# Patient Record
Sex: FEMALE | Race: WHITE | NOT HISPANIC OR LATINO | URBAN - METROPOLITAN AREA
[De-identification: names, ages, dates, MRNs, and addresses within clinical notes are randomized per-mention and may not be internally consistent; named-entity substitution may affect disease eponyms.]

---

## 2024-02-12 PROBLEM — F41.9 ANXIETY DISORDER: Status: ACTIVE | Noted: 2023-05-23

## 2024-02-12 PROBLEM — M48.062 SPINAL STENOSIS OF LUMBAR REGION WITH NEUROGENIC CLAUDICATION: Status: ACTIVE | Noted: 2023-11-27

## 2024-02-12 PROBLEM — I34.1 MITRAL VALVE PROLAPSE: Status: ACTIVE | Noted: 2024-02-12

## 2024-02-12 PROBLEM — C44.01 BASAL CELL CARCINOMA (BCC) OF SKIN OF LIP: Status: ACTIVE | Noted: 2022-12-27

## 2024-02-12 PROBLEM — M81.0 AGE-RELATED OSTEOPOROSIS WITHOUT CURRENT PATHOLOGICAL FRACTURE: Status: ACTIVE | Noted: 2020-10-30

## 2024-02-12 PROBLEM — M54.30 SCIATICA: Status: ACTIVE | Noted: 2020-10-30

## 2024-02-12 PROBLEM — I47.10 SVT (SUPRAVENTRICULAR TACHYCARDIA) (H): Status: ACTIVE | Noted: 2022-11-14

## 2024-02-12 PROBLEM — I34.0 MODERATE MITRAL VALVE REGURGITATION: Status: ACTIVE | Noted: 2022-11-14

## 2024-02-12 RX ORDER — ESCITALOPRAM OXALATE 10 MG/1
10 TABLET ORAL DAILY
COMMUNITY
Start: 2023-12-28 | End: 2024-12-27

## 2024-02-12 RX ORDER — LISINOPRIL 10 MG/1
10 TABLET ORAL DAILY
COMMUNITY
Start: 2023-12-28

## 2024-02-12 RX ORDER — GABAPENTIN 300 MG/1
300 CAPSULE ORAL EVERY 8 HOURS
COMMUNITY
Start: 2023-11-02

## 2024-02-12 RX ORDER — CETIRIZINE HYDROCHLORIDE 10 MG/1
10 TABLET ORAL DAILY PRN
COMMUNITY

## 2024-02-12 RX ORDER — ALENDRONATE SODIUM 70 MG/1
70 TABLET ORAL
COMMUNITY
Start: 2023-12-28 | End: 2024-12-27

## 2024-02-12 RX ORDER — CHLORAL HYDRATE 500 MG
1000 CAPSULE ORAL
COMMUNITY
Start: 2023-11-29

## 2024-03-07 ENCOUNTER — OFFICE VISIT (OUTPATIENT)
Dept: CARDIOLOGY | Facility: CLINIC | Age: 79
End: 2024-03-07
Payer: COMMERCIAL

## 2024-03-07 VITALS
SYSTOLIC BLOOD PRESSURE: 138 MMHG | RESPIRATION RATE: 18 BRPM | WEIGHT: 164.4 LBS | HEART RATE: 75 BPM | OXYGEN SATURATION: 97 % | DIASTOLIC BLOOD PRESSURE: 94 MMHG

## 2024-03-07 DIAGNOSIS — I34.1 MITRAL VALVE PROLAPSE: Primary | ICD-10-CM

## 2024-03-07 PROCEDURE — 99204 OFFICE O/P NEW MOD 45 MIN: CPT | Performed by: INTERNAL MEDICINE

## 2024-03-07 NOTE — LETTER
3/7/2024    Jocelin Crenshaw MD  [facility] [address]  [facility] [address]    RE: Deanna Preciado       Dear Colleague,     I had the pleasure of seeing Deanna Preciado in the [facility].    Cardiology Consultation       Assessment & Plan    1.  Mitral valve prolapse on outside echocardiogram with moderate to severe eccentric posteriorly directed mitral regurgitation  2.  Hypertension  3.  History of palpitations with workup unrevealing, notable for PACs  4.  Osteoporosis  5.  Eczema  6.  History of anxiety  7.  Hyperlipidemia    Recommendations    1.  At least moderate mitral valve regurgitation with mitral valve prolapse: We will get an updated echocardiogram within the [place] system within the next month.  Clinically she is doing well.  I have advised her that should this show significant findings we may need a CHITO for better assessment.  May also get the mitral valve team if indeed it is severe to consider MitraClip.    2.  Echocardiogram will be ordered and patient will return to clinic to review results with us in 4 to 6 weeks.    Thank you kindly for consult      Kerwin Parada MD, MD          History of present illness    Patient is a very pleasant 78-year-old woman who presents to establish local cardiac care.  I am a cardiology caregiver for her .  Her  was seen by our team and underwent TAVR successfully and is doing well.  She herself has had a cardiac murmur all her life and in 1999 was diagnosed with mitral valve prolapse.  Since that time she has been followed in the [facility] periodically with echocardiograms.  Her most recent echocardiogram from them was in 2021 at which point she had normal LV systolic function and moderate anterior leaflet prolapse with moderate regurgitation.  In [facility] a [facility]  This occurred in November 2023 and the findings are as listed  below.  It appears the report had suggested moderate to severe eccentric mitral valve regurgitation directed posteriorly with bileaflet prolapse with anterior greater than posterior.  LV systolic function remained stable.  She underwent her surgery without limitations or difficulty.  Clinically she reports of no significant change in her functional status.  He is able to do her activities of daily living without limitations.  She recently returned back from a trip to [place] and she kept up with her peers and did not feel any shortness of breath chest pain PND or any other cardiac related concerns.  Here for a placement of care.          Echocardiogram November 2023 outside hospital   1. Normal sinus rhythm during study.     2. Normal LV size with normal wall thickness. Calculated biplane LVEF   61%.   No regional wall motion abnormalities. (Normal function). Indeterminate   diastolic function.     3. Normal RV size and systolic function.     4. Moderate-severe eccentric mitral valve regurgitation directed   posteriorly.    5. Mechanism of MR is leaflet prolapse (bileaflet; anterior > posterior).     6. Eccentric, posteriorly directed mitral regurgitation, may   underestimate   degree of insufficiency.     7. A prior study is not available for comparison.         Primary Care Physician  No primary care provider on file.    Patient Active Problem List   Diagnosis    Anxiety disorder    Basal cell carcinoma (BCC) of skin of lip    Essential hypertension    Mitral valve prolapse    Moderate mitral valve regurgitation    Sciatica    Spinal stenosis of lumbar region with neurogenic claudication    SVT (supraventricular tachycardia)    Age-related osteoporosis without current pathological fracture       Past Medical History  I have reviewed this patient's medical history and updated it with pertinent information if needed.   No past medical history on file.    Past Surgical History  I have reviewed this patient's surgical  history and updated it with pertinent information if needed.  No past surgical history on file.    Prior to Admission Medications  Cannot display prior to admission medications because the patient has not been admitted in this contact.     [unfilled]  [unfilled]  Allergies  Allergies   Allergen Reactions    Sulfa Antibiotics Rash       Social History       Family History  No family history on file.    Review of Systems  The comprehensive 10 point Review of Systems is negative other than noted in the HPI or here.     Physical Exam  Vital Signs with Ranges  BP: ()/()   Arterial Line BP: ()/()   Wt Readings from Last 4 Encounters:   No data found for Wt     [unfilled]      Vitals: There were no vitals taken for this visit.    GENERAL: alert and no distress  EYES: Eyes grossly normal to inspection.  No discharge or erythema, or obvious scleral/conjunctival abnormalities.  RESP: No audible wheeze, cough, or visible cyanosis.    SKIN: Visible skin clear. No significant rash, abnormal pigmentation or lesions.  NEURO: Cranial nerves grossly intact.  Mentation and speech appropriate for age.  PSYCH: Appropriate affect, tone, and pace of words       Thank you for allowing me to participate in the care of your patient.      Sincerely,     Kerwin Parada MD     [facility]  cc:   No referring provider defined for this encounter.

## 2024-03-07 NOTE — PROGRESS NOTES
Cardiology Consultation       Assessment & Plan     1.  Mitral valve prolapse on outside echocardiogram with moderate to severe eccentric posteriorly directed mitral regurgitation  2.  Hypertension  3.  History of palpitations with workup unrevealing, notable for PACs  4.  Osteoporosis  5.  Eczema  6.  History of anxiety  7.  Hyperlipidemia    Recommendations    1.  At least moderate mitral valve regurgitation with mitral valve prolapse: We will get an updated echocardiogram within the [place] system within the next month.  Clinically she is doing well.  I have advised her that should this show significant findings we may need a CHITO for better assessment.  May also get the mitral valve team if indeed it is severe to consider MitraClip.    2.  Echocardiogram will be ordered and patient will return to clinic to review results with us in 4 to 6 weeks.    Thank you kindly for consult      Kerwin Parada MD, MD          History of present illness    Patient is a very pleasant 78-year-old woman who presents to establish local cardiac care.  I am a cardiology caregiver for her .  Her  was seen by our team and underwent TAVR successfully and is doing well.  She herself has had a cardiac murmur all her life and in 1999 was diagnosed with mitral valve prolapse.  Since that time she has been followed in the [facility] periodically with echocardiograms.  Her most recent echocardiogram from them was in 2021 at which point she had normal LV systolic function and moderate anterior leaflet prolapse with moderate regurgitation.  In preparation for a surgery she underwent an echocardiogram at [facility].  This occurred in November 2023 and the findings are as listed below.  It appears the report had suggested moderate to severe eccentric mitral valve regurgitation directed posteriorly with bileaflet prolapse with anterior greater than posterior.  LV systolic function remained stable.  She underwent her  surgery without limitations or difficulty.  Clinically she reports of no significant change in her functional status.  He is able to do her activities of daily living without limitations.  She recently returned back from a trip to [place] and she kept up with her peers and did not feel any shortness of breath chest pain PND or any other cardiac related concerns.  Here for a placement of care.          Echocardiogram November 2023 outside hospital   1. Normal sinus rhythm during study.     2. Normal LV size with normal wall thickness. Calculated biplane LVEF   61%.   No regional wall motion abnormalities. (Normal function). Indeterminate   diastolic function.     3. Normal RV size and systolic function.     4. Moderate-severe eccentric mitral valve regurgitation directed   posteriorly.    5. Mechanism of MR is leaflet prolapse (bileaflet; anterior > posterior).     6. Eccentric, posteriorly directed mitral regurgitation, may   underestimate   degree of insufficiency.     7. A prior study is not available for comparison.         Primary Care Physician   No primary care provider on file.    Patient Active Problem List   Diagnosis    Anxiety disorder    Basal cell carcinoma (BCC) of skin of lip    Essential hypertension    Mitral valve prolapse    Moderate mitral valve regurgitation    Sciatica    Spinal stenosis of lumbar region with neurogenic claudication    SVT (supraventricular tachycardia)    Age-related osteoporosis without current pathological fracture       Past Medical History   I have reviewed this patient's medical history and updated it with pertinent information if needed.   No past medical history on file.    Past Surgical History   I have reviewed this patient's surgical history and updated it with pertinent information if needed.  No past surgical history on file.    Prior to Admission Medications   Cannot display prior to admission medications because the patient has not been admitted in this contact.      [unfilled]  [unfilled]  Allergies   Allergies   Allergen Reactions    Sulfa Antibiotics Rash       Social History        Family History   No family history on file.    Review of Systems   The comprehensive 10 point Review of Systems is negative other than noted in the HPI or here.     Physical Exam   Vital Signs with Ranges  BP: ()/()   Arterial Line BP: ()/()   Wt Readings from Last 4 Encounters:   No data found for Wt     [unfilled]      Vitals: There were no vitals taken for this visit.    GENERAL: alert and no distress  EYES: Eyes grossly normal to inspection.  No discharge or erythema, or obvious scleral/conjunctival abnormalities.  RESP: No audible wheeze, cough, or visible cyanosis.    SKIN: Visible skin clear. No significant rash, abnormal pigmentation or lesions.  NEURO: Cranial nerves grossly intact.  Mentation and speech appropriate for age.  PSYCH: Appropriate affect, tone, and pace of words

## 2024-04-01 ENCOUNTER — HOSPITAL ENCOUNTER (OUTPATIENT)
Dept: CARDIOLOGY | Facility: CLINIC | Age: 79
Discharge: HOME | End: 2024-04-01
Attending: INTERNAL MEDICINE | Admitting: INTERNAL MEDICINE
Payer: COMMERCIAL

## 2024-04-01 DIAGNOSIS — I34.1 MITRAL VALVE PROLAPSE: ICD-10-CM

## 2024-04-01 LAB — LVEF ECHO: NORMAL

## 2024-04-01 PROCEDURE — 93306 TTE W/DOPPLER COMPLETE: CPT | Mod: 26 | Performed by: INTERNAL MEDICINE

## 2024-04-01 PROCEDURE — 93306 TTE W/DOPPLER COMPLETE: CPT

## 2024-04-19 ENCOUNTER — HOSPITAL ENCOUNTER (OUTPATIENT)
Facility: CLINIC | Age: 79
End: 2024-04-19
Admitting: INTERNAL MEDICINE
Payer: COMMERCIAL

## 2024-04-19 ENCOUNTER — OFFICE VISIT (OUTPATIENT)
Dept: CARDIOLOGY | Facility: CLINIC | Age: 79
End: 2024-04-19
Attending: INTERNAL MEDICINE
Payer: COMMERCIAL

## 2024-04-19 VITALS
DIASTOLIC BLOOD PRESSURE: 81 MMHG | HEART RATE: 84 BPM | SYSTOLIC BLOOD PRESSURE: 122 MMHG | WEIGHT: 164 LBS | RESPIRATION RATE: 16 BRPM | OXYGEN SATURATION: 100 %

## 2024-04-19 DIAGNOSIS — I34.1 MITRAL VALVE PROLAPSE: Primary | ICD-10-CM

## 2024-04-19 PROCEDURE — 99214 OFFICE O/P EST MOD 30 MIN: CPT | Performed by: INTERNAL MEDICINE

## 2024-04-19 NOTE — PROGRESS NOTES
Cardiology Consultation       Assessment & Plan     1.  Mitral valve prolapse on outside echocardiogram with moderate to severe eccentric posteriorly directed mitral regurgitation  2.  Hypertension  3.  History of palpitations with workup unrevealing, notable for PACs  4.  Osteoporosis  5.  Eczema  6.  History of anxiety  7.  Hyperlipidemia    Recommendations    1.  We have reviewed the findings of the echocardiogram in detail with patient.  It confirms the diagnosis of eccentric moderate to severe posteriorly directed mitral vegetation with anterior greater than posterior prolapse.    2.  Arrange for outpatient CHITO and [facility].  Patient has full neck mobility, no loose teeth, and no difficulty swallowing liquids or solids.    3.  We will send patient to mitral valve clinic for consideration of MitraClip or surgical repair.    4.  We will tentatively schedule for her to return to clinic in 6 months time with me.  We can certainly adjust those plans based on her evaluation in the mitral valve clinic    Thank you kindly for consult      Kerwin Parada MD, MD          History of present illness    Patient is a very pleasant 78-year-old woman who presents to establish local cardiac care.  I am a cardiology caregiver for her .  Her  was seen by our team and underwent TAVR successfully and is doing well.  She herself has had a cardiac murmur all her life and in 1999 was diagnosed with mitral valve prolapse.  Since that time she has been followed in the [facility] periodically with echocardiograms.  Her most recent echocardiogram from them was in 2021 at which point she had normal LV systolic function and moderate anterior leaflet prolapse with moderate regurgitation.  In preparation for a surgery she underwent an echocardiogram at [facility].  This occurred in November 2023 and the findings are as listed below.  It appears the report had suggested moderate to severe  eccentric mitral valve regurgitation directed posteriorly with bileaflet prolapse with anterior greater than posterior.  LV systolic function remained stable.  She underwent her surgery without limitations or difficulty.  Clinically she reports of no significant change in her functional status.  He is able to do her activities of daily living without limitations.  She recently returned back from a trip to [place] and she kept up with her peers and did not feel any shortness of breath chest pain PND or any other cardiac related concerns.  Here for establishment of care.      Echo 2024  Left ventricular systolic function is normal.  The visual ejection fraction is 60-65%.  No regional wall motion abnormalities noted.  Moderate mitral valve prolapse, anterior leaflet  Possible partially flail scallop.  There is moderately severe (3+) mitral regurgitation.  Flow reversal noted in pulmonary veins consistent with significant mitral  regurgitation.  The study was technically adequate. There is no comparison study available      Echocardiogram November 2023 outside hospital   1. Normal sinus rhythm during study.     2. Normal LV size with normal wall thickness. Calculated biplane LVEF   61%.   No regional wall motion abnormalities. (Normal function). Indeterminate   diastolic function.     3. Normal RV size and systolic function.     4. Moderate-severe eccentric mitral valve regurgitation directed   posteriorly.    5. Mechanism of MR is leaflet prolapse (bileaflet; anterior > posterior).     6. Eccentric, posteriorly directed mitral regurgitation, may   underestimate   degree of insufficiency.     7. A prior study is not available for comparison.         Primary Care Physician   Jocelin Crenshaw    Patient Active Problem List   Diagnosis    Anxiety disorder    Basal cell carcinoma (BCC) of skin of lip    Essential hypertension    Mitral valve prolapse    Moderate mitral valve regurgitation    Sciatica    Spinal stenosis of  lumbar region with neurogenic claudication    SVT (supraventricular tachycardia)    Age-related osteoporosis without current pathological fracture       Past Medical History   I have reviewed this patient's medical history and updated it with pertinent information if needed.   No past medical history on file.    Past Surgical History   I have reviewed this patient's surgical history and updated it with pertinent information if needed.  No past surgical history on file.    Prior to Admission Medications   Cannot display prior to admission medications because the patient has not been admitted in this contact.     [unfilled]  [unfilled]  Allergies   Allergies   Allergen Reactions    Banana GI Disturbance    Sulfa Antibiotics Rash       Social History    reports that she quit smoking about 50 years ago. Her smoking use included cigarettes. She has never been exposed to tobacco smoke. She has never used smokeless tobacco.    Family History   No family history on file.    Review of Systems   The comprehensive 10 point Review of Systems is negative other than noted in the HPI or here.     Physical Exam   Vital Signs with Ranges     Wt Readings from Last 4 Encounters:   03/07/24 74.6 kg (164 lb 6.4 oz)     [unfilled]      Vitals: LMP  (LMP Unknown)     GENERAL: alert and no distress  EYES: Eyes grossly normal to inspection.  No discharge or erythema, or obvious scleral/conjunctival abnormalities.  RESP: No audible wheeze, cough, or visible cyanosis.    SKIN: Visible skin clear. No significant rash, abnormal pigmentation or lesions.  NEURO: Cranial nerves grossly intact.  Mentation and speech appropriate for age.  PSYCH: Appropriate affect, tone, and pace of words

## 2024-04-19 NOTE — PATIENT INSTRUCTIONS
"Transesophageal Echocardiogram (CHITO)  [place], [place]      Please call clinic with any new COVID like symptoms prior to procedure.    2.   Transesophageal Echocardiogram (CHITO) to be done at [facility] on ____________ . Please arrive at __________ . If you need to contact [facility] for any reason, please call [phone] option #2.    3.    Follow up with the Structural Heart Clinic at [facility] in [place]. They will call you to schedule an appointment.     Call the [facility] Nurse line with any questions [phone] Daniela RN, Marie RN; Miroslava RN    In preparation for your procedure, we require that you do the following:    NO foods or liquids 8 hours prior to arrival.    Take your usual morning medications with a small sip of water immediately upon arising on the morning of your procedure unless outlined below.    You will be unable to drive after your procedure; please arrange to have someone drive you home. Make sure that there is a responsible adult with you for 24 hours after your procedure. Your procedure will be cancelled if you do not have transportation home or someone staying with you for 24 hrs.    Your procedure will be done at [facility] located at [address] [place] [id number]. Please park in the  Skyway Ramp  on the west side of [facility] on [address]. Take the skyway over [facility] to the hospital. Please check in on the first floor, \"Skyway Welcome Desk\" which is one floor down from the skyway level.    If you have any questions about your upcoming procedure, please contact nursing at [facility] clinic at [phone] or at [facility] at [phone].   "

## 2024-04-19 NOTE — LETTER
4/19/2024    Jocelin Crenshaw MD  [facility] [address]  [facility] [address]    RE: Deanna Osei Preciado       Dear Colleague,     I had the pleasure of seeing Daenna Preciado in the [facility].    Cardiology Consultation       Assessment & Plan    1.  Mitral valve prolapse on outside echocardiogram with moderate to severe eccentric posteriorly directed mitral regurgitation  2.  Hypertension  3.  History of palpitations with workup unrevealing, notable for PACs  4.  Osteoporosis  5.  Eczema  6.  History of anxiety  7.  Hyperlipidemia    Recommendations    1.  We have reviewed the findings of the echocardiogram in detail with patient.  It confirms the diagnosis of eccentric moderate to severe posteriorly directed mitral vegetation with anterior greater than posterior prolapse.    2.  Arrange for outpatient CHITO and [facility].  Patient has full neck mobility, no loose teeth, and no difficulty swallowing liquids or solids.    3.  We will send patient to mitral valve clinic for consideration of MitraClip or surgical repair.    4.  We will tentatively schedule for her to return to clinic in 6 months time with me.  We can certainly adjust those plans based on her evaluation in the mitral valve clinic    Thank you kindly for consult      Kerwin Parada MD, MD          History of present illness    Patient is a very pleasant 78-year-old woman who presents to establish local cardiac care.  I am a cardiology caregiver for her .  Her  was seen by our team and underwent TAVR successfully and is doing well.  She herself has had a cardiac murmur all her life and in 1999 was diagnosed with mitral valve prolapse.  Since that time she has been followed in the [facility] periodically with echocardiograms.  Her most recent echocardiogram from them was in 2021 at which point she had normal LV systolic function and moderate anterior leaflet prolapse  with moderate regurgitation.  In preparation for a surgery she underwent an echocardiogram at [facility].  This occurred in November 2023 and the findings are as listed below.  It appears the report had suggested moderate to severe eccentric mitral valve regurgitation directed posteriorly with bileaflet prolapse with anterior greater than posterior.  LV systolic function remained stable.  She underwent her surgery without limitations or difficulty.  Clinically she reports of no significant change in her functional status.  He is able to do her activities of daily living without limitations.  She recently returned back from a trip to [place] and she kept up with her peers and did not feel any shortness of breath chest pain PND or any other cardiac related concerns.  Here for establishment of care.      Echo 2024  Left ventricular systolic function is normal.  The visual ejection fraction is 60-65%.  No regional wall motion abnormalities noted.  Moderate mitral valve prolapse, anterior leaflet  Possible partially flail scallop.  There is moderately severe (3+) mitral regurgitation.  Flow reversal noted in pulmonary veins consistent with significant mitral  regurgitation.  The study was technically adequate. There is no comparison study available      Echocardiogram November 2023 outside hospital   1. Normal sinus rhythm during study.     2. Normal LV size with normal wall thickness. Calculated biplane LVEF   61%.   No regional wall motion abnormalities. (Normal function). Indeterminate   diastolic function.     3. Normal RV size and systolic function.     4. Moderate-severe eccentric mitral valve regurgitation directed   posteriorly.    5. Mechanism of MR is leaflet prolapse (bileaflet; anterior > posterior).     6. Eccentric, posteriorly directed mitral regurgitation, may   underestimate   degree of insufficiency.     7. A prior study is not available for comparison.         Primary Care Physician  Jocelin  Den    Patient Active Problem List   Diagnosis    Anxiety disorder    Basal cell carcinoma (BCC) of skin of lip    Essential hypertension    Mitral valve prolapse    Moderate mitral valve regurgitation    Sciatica    Spinal stenosis of lumbar region with neurogenic claudication    SVT (supraventricular tachycardia)    Age-related osteoporosis without current pathological fracture       Past Medical History  I have reviewed this patient's medical history and updated it with pertinent information if needed.   No past medical history on file.    Past Surgical History  I have reviewed this patient's surgical history and updated it with pertinent information if needed.  No past surgical history on file.    Prior to Admission Medications  Cannot display prior to admission medications because the patient has not been admitted in this contact.     [unfilled]  [unfilled]  Allergies  Allergies   Allergen Reactions    Banana GI Disturbance    Sulfa Antibiotics Rash       Social History   reports that she quit smoking about 50 years ago. Her smoking use included cigarettes. She has never been exposed to tobacco smoke. She has never used smokeless tobacco.    Family History  No family history on file.    Review of Systems  The comprehensive 10 point Review of Systems is negative other than noted in the HPI or here.     Physical Exam  Vital Signs with Ranges     Wt Readings from Last 4 Encounters:   03/07/24 74.6 kg (164 lb 6.4 oz)     [unfilled]      Vitals: LMP  (LMP Unknown)     GENERAL: alert and no distress  EYES: Eyes grossly normal to inspection.  No discharge or erythema, or obvious scleral/conjunctival abnormalities.  RESP: No audible wheeze, cough, or visible cyanosis.    SKIN: Visible skin clear. No significant rash, abnormal pigmentation or lesions.  NEURO: Cranial nerves grossly intact.  Mentation and speech appropriate for age.  PSYCH: Appropriate affect, tone, and pace of words       Thank you for  allowing me to participate in the care of your patient.      Sincerely,     Kerwin Parada MD     [facility]  cc:   Kerwin Parada MD  [address]  [place] [id number]

## 2024-04-26 ENCOUNTER — TELEPHONE (OUTPATIENT)
Dept: CARDIOLOGY | Facility: CLINIC | Age: 79
End: 2024-04-26
Payer: COMMERCIAL

## 2024-04-26 NOTE — TELEPHONE ENCOUNTER
[facility] Call Center    Phone Message    May a detailed message be left on voicemail: yes     Reason for Call: Other: pt has been calling to try and schedule order from Tal Torres to see pt for Mitral valve prolapse [I34.1], structural/valve mitral clinic. Pt has had several in basket messages from priority line to ADDISON team already trying to get someone to reach back out to the patient to schedule or clarify. SAC is not sure if this is a return provider change, return cardiology, new/return structural, or what the Mitral clinic is. Please clarify orders and how pt needs to be scheduled and reach out to pt to schedule.      Action Taken: Other: cardiology     Travel Screening: Not Applicable                                                              Thank you!  [facility]

## 2024-04-26 NOTE — TELEPHONE ENCOUNTER
Structural Heart Clinic [facility]    RUPERTO referral received from: Dr. Parada    Chart and recent lab results reviewed.  CHITO: 5/3/24  Contrast allergy: None noted     Contacted patient to introduce self.     Last dental visit: Goes routinely. Last visit was a couple weeks ago.   Support/living situation: Independent with  Panchito Reis.     Arranged RUPERTO consult with Dr. Torres  Provided direct contact information.     Gretel Lozano RN  Structural Heart Coordinator  [facility]

## 2024-05-01 ENCOUNTER — TELEPHONE (OUTPATIENT)
Dept: CARDIOLOGY | Facility: CLINIC | Age: 79
End: 2024-05-01
Payer: COMMERCIAL

## 2024-05-01 NOTE — TELEPHONE ENCOUNTER
Called patient to touch base before scheduled CHITO on Friday 05/03/2024 and reviewed with patient and Dr. Torres is recommending we move her CHITO so it is completed by one of our structural imagining providers.    Patient stated she understood. We discussed timing for work-up and plan was made to keep her office visit with Dr. Torres on 05/09/2024 to discuss work-up. Patient stated she is feeling really well and is currently still able to do all the things she wants. Patient stated she walked 1 mile this AM without needing to take any breaks. Reviewed with patient that Dr. Torres will discuss timing for mitral valve intervention given her symptoms as well. Patient stated she understood.    Provided patient with my direct callback phone number and encouraged her to call with any questions or concerns.

## 2024-05-09 ENCOUNTER — OFFICE VISIT (OUTPATIENT)
Dept: CARDIOLOGY | Facility: CLINIC | Age: 79
End: 2024-05-09
Attending: INTERNAL MEDICINE
Payer: COMMERCIAL

## 2024-05-09 VITALS
RESPIRATION RATE: 15 BRPM | HEIGHT: 67 IN | WEIGHT: 162 LBS | SYSTOLIC BLOOD PRESSURE: 158 MMHG | HEART RATE: 75 BPM | OXYGEN SATURATION: 100 % | DIASTOLIC BLOOD PRESSURE: 80 MMHG | BODY MASS INDEX: 25.43 KG/M2

## 2024-05-09 DIAGNOSIS — I34.1 MITRAL VALVE PROLAPSE: ICD-10-CM

## 2024-05-09 PROCEDURE — 99215 OFFICE O/P EST HI 40 MIN: CPT | Performed by: INTERNAL MEDICINE

## 2024-05-09 PROCEDURE — 93000 ELECTROCARDIOGRAM COMPLETE: CPT | Performed by: INTERNAL MEDICINE

## 2024-05-09 NOTE — PROGRESS NOTES
HPI and Plan:     Deanna Preciado is a 79 y/o female referred for possible TMVR>  Patient has no limitations.  Walks 1-2X per week walking 1 mile in 25 minutes. Recently had spine surgery in Nov limiting her so maybe has slowed compared to that slightly.      Had been following with Dr. Amador in valve clinic at [place].  Saw last 2021.  MR at that time was moderate.     Tamara's mother daughter was also on the phone who is a psychiatrist.  She admits that Deanna has not slowed down or has any shortness of breath with walking.  I reviewed the echocardiogram personally.  The left atrial size is small as is the right atrial size.  LV size and RV size and function are normal.  There is no evidence of pulmonary hypertension.  Given all of the above including lack of symptoms think it is best to continue watching Deanna.  I will schedule her back to see me in 6 months time and if asymptomatic still then repeat an echo at 1 years time to continue to follow this eccentric probable 3+ mitral regurgitation.            Today's clinic visit entailed:  Review of the result(s) of each unique test - echocardiogram  Assessment requiring an independent historian(s) - family - daughter  Ordering of each unique test  45 minutes spent by me on the date of the encounter doing chart review, history and exam, documentation and further activities per the note  Provider  Link to [facility] Help Grid     The level of medical decision making during this visit was of high complexity.      Orders Placed This Encounter   Procedures    EKG 12-lead complete w/read - Clinics (performed today)     No orders of the defined types were placed in this encounter.    There are no discontinued medications.      Encounter Diagnosis   Name Primary?    Mitral valve prolapse        CURRENT MEDICATIONS:  Current Outpatient Medications   Medication Sig Dispense Refill    alendronate (FOSAMAX) 70 MG tablet Take 70 mg by mouth every 7 days      Calcium Carb-Cholecalciferol  "(CALCIUM 500 + D PO)       cetirizine (ZYRTEC) 10 MG tablet Take 10 mg by mouth daily as needed for allergies      escitalopram (LEXAPRO) 10 MG tablet Take 10 mg by mouth daily      fish oil-omega-3 fatty acids 1000 MG capsule Take 1,000 mg by mouth      lisinopril (ZESTRIL) 10 MG tablet Take 10 mg by mouth daily      Multiple Vitamins-Minerals (MACULAR HEALTH FORMULA PO)       Multiple Vitamins-Minerals (WOMENS 50+ MULTI VITAMIN PO)       gabapentin (NEURONTIN) 300 MG capsule Take 300 mg by mouth every 8 hours (Patient not taking: Reported on 3/7/2024)         ALLERGIES     Allergies   Allergen Reactions    Banana GI Disturbance    Sulfa Antibiotics Rash       PAST MEDICAL HISTORY:  No past medical history on file.    PAST SURGICAL HISTORY:  No past surgical history on file.    FAMILY HISTORY:  No family history on file.    SOCIAL HISTORY:  Social History     Socioeconomic History    Marital status:    Tobacco Use    Smoking status: Former     Current packs/day: 0.00     Types: Cigarettes     Quit date:      Years since quittin.3     Passive exposure: Never    Smokeless tobacco: Never       Review of Systems:  Skin:        Eyes:       ENT:       Respiratory:       Cardiovascular:       Gastroenterology:      Genitourinary:       Musculoskeletal:       Neurologic:       Psychiatric:       Heme/Lymph/Imm:       Endocrine:         Physical Exam:  Vitals: BP (!) 158/80   Pulse 75   Resp 15   Ht 1.702 m (5' 7\")   Wt 73.5 kg (162 lb)   LMP  (LMP Unknown)   SpO2 100%   BMI 25.37 kg/m      Constitutional:           Skin:             Head:           Eyes:           Lymph:      ENT:           Neck:           Respiratory:            Cardiac:                                                           GI:           Extremities and Muscular Skeletal:                 Neurological:           Psych:         Recent Lab Results:  LIPID RESULTS:  No results found for: \"CHOL\", \"HDL\", \"LDL\", \"TRIG\", " "\"CHOLHDLRATIO\"    LIVER ENZYME RESULTS:  No results found for: \"AST\", \"ALT\"    CBC RESULTS:  No results found for: \"WBC\", \"RBC\", \"HGB\", \"HCT\", \"MCV\", \"MCH\", \"MCHC\", \"RDW\", \"PLT\"    BMP RESULTS:  No results found for: \"NA\", \"POTASSIUM\", \"CHLORIDE\", \"CO2\", \"ANIONGAP\", \"GLC\", \"BUN\", \"CR\", \"GFRESTIMATED\", \"GFRESTBLACK\", \"SAEED\"     A1C RESULTS:  No results found for: \"A1C\"    INR RESULTS:  No results found for: \"INR\"        CC  Kerwin Parada MD  [address]  [place] [id number]                "

## 2024-05-09 NOTE — LETTER
5/9/2024    Jocelin Crenshaw MD  [facility] [address]  [facility] [address]    RE: Deanna Preciado       Dear Colleague,     I had the pleasure of seeing Deanna Preciado in the [facility].  HPI and Plan:     Deanna Preicado is a 77 y/o female referred for possible TMVR>  Patient has no limitations.  Walks 1-2X per week walking 1 mile in 25 minutes. Recently had spine surgery in Nov limiting her so maybe has slowed compared to that slightly.      Had been following with Dr. Amador in valve clinic at [place].  Saw last 2021.  MR at that time was moderate.     Tamara's mother daughter was also on the phone who is a psychiatrist.  She admits that Deanna has not slowed down or has any shortness of breath with walking.  I reviewed the echocardiogram personally.  The left atrial size is small as is the right atrial size.  LV size and RV size and function are normal.  There is no evidence of pulmonary hypertension.  Given all of the above including lack of symptoms think it is best to continue watching Deanna.  I will schedule her back to see me in 6 months time and if asymptomatic still then repeat an echo at 1 years time to continue to follow this eccentric probable 3+ mitral regurgitation.            Today's clinic visit entailed:  Review of the result(s) of each unique test - echocardiogram  Assessment requiring an independent historian(s) - family - daughter  Ordering of each unique test  45 minutes spent by me on the date of the encounter doing chart review, history and exam, documentation and further activities per the note  Provider  Link to [facility] Help Grid     The level of medical decision making during this visit was of high complexity.      Orders Placed This Encounter   Procedures    EKG 12-lead complete w/read - Clinics (performed today)     No orders of the defined types were placed in this encounter.    There are no discontinued medications.      Encounter  "Diagnosis   Name Primary?    Mitral valve prolapse        CURRENT MEDICATIONS:  Current Outpatient Medications   Medication Sig Dispense Refill    alendronate (FOSAMAX) 70 MG tablet Take 70 mg by mouth every 7 days      Calcium Carb-Cholecalciferol (CALCIUM 500 + D PO)       cetirizine (ZYRTEC) 10 MG tablet Take 10 mg by mouth daily as needed for allergies      escitalopram (LEXAPRO) 10 MG tablet Take 10 mg by mouth daily      fish oil-omega-3 fatty acids 1000 MG capsule Take 1,000 mg by mouth      lisinopril (ZESTRIL) 10 MG tablet Take 10 mg by mouth daily      Multiple Vitamins-Minerals (MACULAR HEALTH FORMULA PO)       Multiple Vitamins-Minerals (WOMENS 50+ MULTI VITAMIN PO)       gabapentin (NEURONTIN) 300 MG capsule Take 300 mg by mouth every 8 hours (Patient not taking: Reported on 3/7/2024)         ALLERGIES     Allergies   Allergen Reactions    Banana GI Disturbance    Sulfa Antibiotics Rash       PAST MEDICAL HISTORY:  No past medical history on file.    PAST SURGICAL HISTORY:  No past surgical history on file.    FAMILY HISTORY:  No family history on file.    SOCIAL HISTORY:  Social History     Socioeconomic History    Marital status:    Tobacco Use    Smoking status: Former     Current packs/day: 0.00     Types: Cigarettes     Quit date:      Years since quittin.3     Passive exposure: Never    Smokeless tobacco: Never       Review of Systems:  Skin:        Eyes:       ENT:       Respiratory:       Cardiovascular:       Gastroenterology:      Genitourinary:       Musculoskeletal:       Neurologic:       Psychiatric:       Heme/Lymph/Imm:       Endocrine:         Physical Exam:  Vitals: BP (!) 158/80   Pulse 75   Resp 15   Ht 1.702 m (5' 7\")   Wt 73.5 kg (162 lb)   LMP  (LMP Unknown)   SpO2 100%   BMI 25.37 kg/m      Constitutional:           Skin:             Head:           Eyes:           Lymph:      ENT:           Neck:           Respiratory:            Cardiac:                   " "                                        GI:           Extremities and Muscular Skeletal:                 Neurological:           Psych:         Recent Lab Results:  LIPID RESULTS:  No results found for: \"CHOL\", \"HDL\", \"LDL\", \"TRIG\", \"CHOLHDLRATIO\"    LIVER ENZYME RESULTS:  No results found for: \"AST\", \"ALT\"    CBC RESULTS:  No results found for: \"WBC\", \"RBC\", \"HGB\", \"HCT\", \"MCV\", \"MCH\", \"MCHC\", \"RDW\", \"PLT\"    BMP RESULTS:  No results found for: \"NA\", \"POTASSIUM\", \"CHLORIDE\", \"CO2\", \"ANIONGAP\", \"GLC\", \"BUN\", \"CR\", \"GFRESTIMATED\", \"GFRESTBLACK\", \"SAEED\"     A1C RESULTS:  No results found for: \"A1C\"    INR RESULTS:  No results found for: \"INR\"      CC  Kerwin Parada MD  [address] [facility] [address]  [place] [address]      Thank you for allowing me to participate in the care of your patient.      Sincerely,     MATTY GARAY MD     [facility]  "

## 2024-05-19 ENCOUNTER — HEALTH MAINTENANCE LETTER (OUTPATIENT)
Age: 79
End: 2024-05-19

## 2024-11-12 ENCOUNTER — OFFICE VISIT (OUTPATIENT)
Dept: CARDIOLOGY | Facility: CLINIC | Age: 79
End: 2024-11-12
Payer: COMMERCIAL

## 2024-11-12 VITALS
HEIGHT: 67 IN | OXYGEN SATURATION: 98 % | SYSTOLIC BLOOD PRESSURE: 144 MMHG | DIASTOLIC BLOOD PRESSURE: 84 MMHG | WEIGHT: 169 LBS | BODY MASS INDEX: 26.53 KG/M2 | HEART RATE: 64 BPM

## 2024-11-12 DIAGNOSIS — I34.1 MITRAL VALVE PROLAPSE: ICD-10-CM

## 2024-11-12 NOTE — PROGRESS NOTES
HPI and Plan:   Deanna is here for longitudinal care and management of @ significant mitral regurgitation.     I had the opportunity of seeing Deanna Preciado back in May 2024 referred for possible TMVR.  At that time she was asymptomatic walking 1-2 times a week.  She had previously been followed with Dr. Olayinka Martinez at [facility] who she saw in 2021 with moderate mitral regurgitation.  Her most recent echocardiogram showed worsening to 3+ eccentric mitral regurgitation.  LV size and function were normal.  She was asymptomatic at the time of continued medical management and follow-up.    On my seeing her back now 6 months later she is still feeling the same with no changes or to your deterioration.  She walks on a variable basis she gets in approximately 6000 steps per day with no shortness of breath chest pain fatigue or edema.  He has had no hospitalizations for congestive heart failure.    Recommend continued medical management and follow-up as we have been doing.  Will see her back in 6 months time in structural clinic with a repeat echocardiogram.    The longitudinal plan of care for the diagnosis(es)/condition(s) as documented were addressed during this visit. Due to the added complexity in care, I will continue to support Deanna in the subsequent management and with ongoing continuity of care.     Today's clinic visit entailed:  Review of the result(s) of each unique test - echocardiogram  Ordering of each unique test  30 minutes spent by me on the date of the encounter doing chart review, history and exam, documentation and further activities per the note  Provider  Link to [facility] Help Grid     The level of medical decision making during this visit was of high complexity.      No orders of the defined types were placed in this encounter.    No orders of the defined types were placed in this encounter.    There are no discontinued medications.      Encounter Diagnosis   Name Primary?    Mitral valve prolapse         CURRENT MEDICATIONS:  Current Outpatient Medications   Medication Sig Dispense Refill    alendronate (FOSAMAX) 70 MG tablet Take 70 mg by mouth every 7 days      Calcium Carb-Cholecalciferol (CALCIUM 500 + D PO)       cetirizine (ZYRTEC) 10 MG tablet Take 10 mg by mouth daily as needed for allergies      escitalopram (LEXAPRO) 10 MG tablet Take 10 mg by mouth daily      fish oil-omega-3 fatty acids 1000 MG capsule Take 1,000 mg by mouth      lisinopril (ZESTRIL) 10 MG tablet Take 10 mg by mouth daily      Multiple Vitamins-Minerals (MACULAR HEALTH FORMULA PO)       Multiple Vitamins-Minerals (WOMENS 50+ MULTI VITAMIN PO)       gabapentin (NEURONTIN) 300 MG capsule Take 300 mg by mouth every 8 hours (Patient not taking: Reported on 3/7/2024)         ALLERGIES     Allergies   Allergen Reactions    Banana GI Disturbance    Sulfa Antibiotics Rash       PAST MEDICAL HISTORY:  History reviewed. No pertinent past medical history.    PAST SURGICAL HISTORY:  History reviewed. No pertinent surgical history.    FAMILY HISTORY:  History reviewed. No pertinent family history.    SOCIAL HISTORY:  Social History     Socioeconomic History    Marital status:      Spouse name: None    Number of children: None    Years of education: None    Highest education level: None   Tobacco Use    Smoking status: Former     Current packs/day: 0.00     Types: Cigarettes     Quit date:      Years since quittin.8     Passive exposure: Never    Smokeless tobacco: Never       Review of Systems:  Skin:        Eyes:       ENT:       Respiratory:  Negative shortness of breath;dyspnea on exertion;dyspnea at rest  Cardiovascular:  Negative;chest pain;palpitations;edema;dizziness;syncope or near-syncope;lightheadedness;fatigue    Gastroenterology:      Genitourinary:       Musculoskeletal:       Neurologic:       Psychiatric:       Heme/Lymph/Imm:       Endocrine:         Physical Exam:  Vitals: BP (!) 144/84   Pulse 64   Ht 1.702 m  "(5' 7\")   Wt 76.7 kg (169 lb)   LMP  (LMP Unknown)   SpO2 98%   BMI 26.47 kg/m      Constitutional:           Skin:             Head:           Eyes:           Lymph:      ENT:           Neck:           Respiratory:            Cardiac:                                                           GI:           Extremities and Muscular Skeletal:                 Neurological:           Psych:         Recent Lab Results:  LIPID RESULTS:  No results found for: \"CHOL\", \"HDL\", \"LDL\", \"TRIG\", \"CHOLHDLRATIO\"    LIVER ENZYME RESULTS:  No results found for: \"AST\", \"ALT\"    CBC RESULTS:  No results found for: \"WBC\", \"RBC\", \"HGB\", \"HCT\", \"MCV\", \"MCH\", \"MCHC\", \"RDW\", \"PLT\"    BMP RESULTS:  No results found for: \"NA\", \"POTASSIUM\", \"CHLORIDE\", \"CO2\", \"ANIONGAP\", \"GLC\", \"BUN\", \"CR\", \"GFRESTIMATED\", \"GFRESTBLACK\", \"SAEED\"     A1C RESULTS:  No results found for: \"A1C\"    INR RESULTS:  No results found for: \"INR\"        CC  Steve Torres MD  [address]  [place] [id number]                "

## 2024-11-12 NOTE — LETTER
11/12/2024    Jocelin Crenshaw MD  [facility] [address]  [facility] [address]    RE: Deanna Preciado       Dear Colleague,     I had the pleasure of seeing Deanna Preciado in the [facility].  HPI and Plan:   Deanna is here for longitudinal care and management of @ significant mitral regurgitation.     I had the opportunity of seeing Deanna Preciado back in May 2024 referred for possible TMVR.  At that time she was asymptomatic walking 1-2 times a week.  She had previously been followed with Dr. Olayinka Martinez at [facility] who she saw in 2021 with moderate mitral regurgitation.  Her most recent echocardiogram showed worsening to 3+ eccentric mitral regurgitation.  LV size and function were normal.  She was asymptomatic at the time of continued medical management and follow-up.    On my seeing her back now 6 months later she is still feeling the same with no changes or to your deterioration.  She walks on a variable basis she gets in approximately 6000 steps per day with no shortness of breath chest pain fatigue or edema.  He has had no hospitalizations for congestive heart failure.    Recommend continued medical management and follow-up as we have been doing.  Will see her back in 6 months time in structural clinic with a repeat echocardiogram.    The longitudinal plan of care for the diagnosis(es)/condition(s) as documented were addressed during this visit. Due to the added complexity in care, I will continue to support Deanna in the subsequent management and with ongoing continuity of care.     Today's clinic visit entailed:  Review of the result(s) of each unique test - echocardiogram  Ordering of each unique test  30 minutes spent by me on the date of the encounter doing chart review, history and exam, documentation and further activities per the note  Provider  Link to [facility] Help Grid     The level of medical decision making during this visit was of high  complexity.      No orders of the defined types were placed in this encounter.    No orders of the defined types were placed in this encounter.    There are no discontinued medications.      Encounter Diagnosis   Name Primary?     Mitral valve prolapse        CURRENT MEDICATIONS:  Current Outpatient Medications   Medication Sig Dispense Refill     alendronate (FOSAMAX) 70 MG tablet Take 70 mg by mouth every 7 days       Calcium Carb-Cholecalciferol (CALCIUM 500 + D PO)        cetirizine (ZYRTEC) 10 MG tablet Take 10 mg by mouth daily as needed for allergies       escitalopram (LEXAPRO) 10 MG tablet Take 10 mg by mouth daily       fish oil-omega-3 fatty acids 1000 MG capsule Take 1,000 mg by mouth       lisinopril (ZESTRIL) 10 MG tablet Take 10 mg by mouth daily       Multiple Vitamins-Minerals (MACULAR HEALTH FORMULA PO)        Multiple Vitamins-Minerals (WOMENS 50+ MULTI VITAMIN PO)        gabapentin (NEURONTIN) 300 MG capsule Take 300 mg by mouth every 8 hours (Patient not taking: Reported on 3/7/2024)         ALLERGIES     Allergies   Allergen Reactions     Banana GI Disturbance     Sulfa Antibiotics Rash       PAST MEDICAL HISTORY:  History reviewed. No pertinent past medical history.    PAST SURGICAL HISTORY:  History reviewed. No pertinent surgical history.    FAMILY HISTORY:  History reviewed. No pertinent family history.    SOCIAL HISTORY:  Social History     Socioeconomic History     Marital status:      Spouse name: None     Number of children: None     Years of education: None     Highest education level: None   Tobacco Use     Smoking status: Former     Current packs/day: 0.00     Types: Cigarettes     Quit date:      Years since quittin.8     Passive exposure: Never     Smokeless tobacco: Never       Review of Systems:  Skin:        Eyes:       ENT:       Respiratory:  Negative shortness of breath;dyspnea on exertion;dyspnea at rest  Cardiovascular:  Negative;chest  "pain;palpitations;edema;dizziness;syncope or near-syncope;lightheadedness;fatigue    Gastroenterology:      Genitourinary:       Musculoskeletal:       Neurologic:       Psychiatric:       Heme/Lymph/Imm:       Endocrine:         Physical Exam:  Vitals: BP (!) 144/84   Pulse 64   Ht 1.702 m (5' 7\")   Wt 76.7 kg (169 lb)   LMP  (LMP Unknown)   SpO2 98%   BMI 26.47 kg/m      Constitutional:           Skin:             Head:           Eyes:           Lymph:      ENT:           Neck:           Respiratory:            Cardiac:                                                           GI:           Extremities and Muscular Skeletal:                 Neurological:           Psych:         Recent Lab Results:  LIPID RESULTS:  No results found for: \"CHOL\", \"HDL\", \"LDL\", \"TRIG\", \"CHOLHDLRATIO\"    LIVER ENZYME RESULTS:  No results found for: \"AST\", \"ALT\"    CBC RESULTS:  No results found for: \"WBC\", \"RBC\", \"HGB\", \"HCT\", \"MCV\", \"MCH\", \"MCHC\", \"RDW\", \"PLT\"    BMP RESULTS:  No results found for: \"NA\", \"POTASSIUM\", \"CHLORIDE\", \"CO2\", \"ANIONGAP\", \"GLC\", \"BUN\", \"CR\", \"GFRESTIMATED\", \"GFRESTBLACK\", \"SAEED\"     A1C RESULTS:  No results found for: \"A1C\"    INR RESULTS:  No results found for: \"INR\"        CC  Steve Garay MD  [address]  [place]  [place] [id number]                  Thank you for allowing me to participate in the care of your patient.      Sincerely,     STEVE GARAY MD     [facility]  cc:   Steve Garay MD  [address]  [place] [id number]      "

## 2025-05-07 ENCOUNTER — RESULTS FOLLOW-UP (OUTPATIENT)
Dept: CARDIOLOGY | Facility: CLINIC | Age: 80
End: 2025-05-07

## 2025-05-07 ENCOUNTER — HOSPITAL ENCOUNTER (OUTPATIENT)
Dept: CARDIOLOGY | Facility: CLINIC | Age: 80
Discharge: HOME | End: 2025-05-07
Attending: INTERNAL MEDICINE
Payer: COMMERCIAL

## 2025-05-07 DIAGNOSIS — I34.1 MITRAL VALVE PROLAPSE: ICD-10-CM

## 2025-05-07 LAB — LVEF ECHO: NORMAL

## 2025-05-07 PROCEDURE — 93306 TTE W/DOPPLER COMPLETE: CPT

## 2025-05-07 PROCEDURE — 93306 TTE W/DOPPLER COMPLETE: CPT | Mod: 26 | Performed by: INTERNAL MEDICINE

## 2025-05-13 ENCOUNTER — OFFICE VISIT (OUTPATIENT)
Dept: CARDIOLOGY | Facility: CLINIC | Age: 80
End: 2025-05-13
Attending: INTERNAL MEDICINE
Payer: COMMERCIAL

## 2025-05-13 VITALS
HEIGHT: 67 IN | DIASTOLIC BLOOD PRESSURE: 78 MMHG | HEART RATE: 73 BPM | BODY MASS INDEX: 27 KG/M2 | SYSTOLIC BLOOD PRESSURE: 137 MMHG | WEIGHT: 172 LBS

## 2025-05-13 DIAGNOSIS — I34.1 MITRAL VALVE PROLAPSE: ICD-10-CM

## 2025-05-13 NOTE — LETTER
5/13/2025    Jocelin Crenshaw MD  [facility] [address]  [facility] [address]    RE: Deanna Preciado       Dear Colleague,     I had the pleasure of seeing Deanna Preciado in the [facility].  HPI and Plan:   Deanna is here for longitudinal care and management of @ MVP, MR, HTN, HLD,.      Last seen Deanna 11/24 for possible TMVR.  Asymptomatic walking 1-2 X per week.  Most recent echo showing eccentric 3+ MR.  Walking 6000 steps per day.  We recommended continued medical therapy.    Recent echocardiogram performed 5/7/25 3+ MR with no changes.     Still doing well very active.  No symptoms, gardening, no SOB, no edema or chest pain.     Took a trip to [place] this winter and had one episode of edema otherwise none.     Echo showing normal LV size and function, mild LAE by volume measurements and normal PA pressures.     MVP murmur unchanged.     Plan visits every 6 months and echocardiogram every year.      The longitudinal plan of care for the diagnosis(es)/condition(s) as documented were addressed during this visit. Due to the added complexity in care, I will continue to support Deanna in the subsequent management and with ongoing continuity of care.     Today's clinic visit entailed:  Review of the result(s) of each unique test - Echocardiogram  The following tests were independently interpreted by me as noted in my documentation: Echo,   Ordering of each unique test  30 minutes spent by me on the date of the encounter doing chart review, history and exam, documentation and further activities per the note  Provider  Link to [facility] Help Grid     The level of medical decision making during this visit was of moderate complexity.      No orders of the defined types were placed in this encounter.    No orders of the defined types were placed in this encounter.    There are no discontinued medications.      Encounter Diagnosis   Name Primary?     Mitral valve  "prolapse        CURRENT MEDICATIONS:  Current Outpatient Medications   Medication Sig Dispense Refill     alendronate (FOSAMAX) 70 MG tablet Take 70 mg by mouth every 7 days       Calcium Carb-Cholecalciferol (CALCIUM 500 + D PO)        cetirizine (ZYRTEC) 10 MG tablet Take 10 mg by mouth daily as needed for allergies       escitalopram (LEXAPRO) 10 MG tablet Take 10 mg by mouth daily       fish oil-omega-3 fatty acids 1000 MG capsule Take 1,000 mg by mouth       gabapentin (NEURONTIN) 300 MG capsule Take 300 mg by mouth every 8 hours (Patient not taking: Reported on 2025)       lisinopril (ZESTRIL) 10 MG tablet Take 10 mg by mouth daily       Multiple Vitamins-Minerals (MACULAR HEALTH FORMULA PO)        Multiple Vitamins-Minerals (WOMENS 50+ MULTI VITAMIN PO)          ALLERGIES     Allergies   Allergen Reactions     Banana GI Disturbance     Sulfa Antibiotics Rash       PAST MEDICAL HISTORY:  History reviewed. No pertinent past medical history.    PAST SURGICAL HISTORY:  History reviewed. No pertinent surgical history.    FAMILY HISTORY:  History reviewed. No pertinent family history.    SOCIAL HISTORY:  Social History     Socioeconomic History     Marital status:      Spouse name: None     Number of children: None     Years of education: None     Highest education level: None   Tobacco Use     Smoking status: Former     Current packs/day: 0.00     Types: Cigarettes     Quit date:      Years since quittin.3     Passive exposure: Never     Smokeless tobacco: Never       Review of Systems:  Skin:        Eyes:       ENT:       Respiratory:       Cardiovascular:       Gastroenterology:      Genitourinary:       Musculoskeletal:       Neurologic:       Psychiatric:       Heme/Lymph/Imm:       Endocrine:         Physical Exam:  Vitals: Ht 1.689 m (5' 6.5\")   Wt 78 kg (172 lb)   LMP  (LMP Unknown)   BMI 27.35 kg/m      Constitutional:           Skin:             Head:           Eyes:       " "    Lymph:      ENT:           Neck:           Respiratory:            Cardiac:                                                           GI:           Extremities and Muscular Skeletal:                 Neurological:           Psych:         Recent Lab Results:  LIPID RESULTS:  No results found for: \"CHOL\", \"HDL\", \"LDL\", \"TRIG\", \"CHOLHDLRATIO\"    LIVER ENZYME RESULTS:  No results found for: \"AST\", \"ALT\"    CBC RESULTS:  No results found for: \"WBC\", \"RBC\", \"HGB\", \"HCT\", \"MCV\", \"MCH\", \"MCHC\", \"RDW\", \"PLT\"    BMP RESULTS:  No results found for: \"NA\", \"POTASSIUM\", \"CHLORIDE\", \"CO2\", \"ANIONGAP\", \"GLC\", \"BUN\", \"CR\", \"GFRESTIMATED\", \"GFRESTBLACK\", \"SAEED\"     A1C RESULTS:  No results found for: \"A1C\"    INR RESULTS:  No results found for: \"INR\"        CC  Steve Garay MD  [address]  [place] [address]                  Thank you for allowing me to participate in the care of your patient.      Sincerely,     STEVE GARAY MD     [facility]  cc:   Steve Garay MD  [address]  [place] [address]      "

## 2025-05-13 NOTE — PROGRESS NOTES
HPI and Plan:   Deanna is here for longitudinal care and management of @ MVP, MR, HTN, HLD,.      Last seen Deanna 11/24 for possible TMVR.  Asymptomatic walking 1-2 X per week.  Most recent echo showing eccentric 3+ MR.  Walking 6000 steps per day.  We recommended continued medical therapy.    Recent echocardiogram performed 5/7/25 3+ MR with no changes.     Still doing well very active.  No symptoms, gardening, no SOB, no edema or chest pain.     Took a trip to [place] this winter and had one episode of edema otherwise none.     Echo showing normal LV size and function, mild LAE by volume measurements and normal PA pressures.     MVP murmur unchanged.     Plan visits every 6 months and echocardiogram every year.      The longitudinal plan of care for the diagnosis(es)/condition(s) as documented were addressed during this visit. Due to the added complexity in care, I will continue to support Deanna in the subsequent management and with ongoing continuity of care.     Today's clinic visit entailed:  Review of the result(s) of each unique test - Echocardiogram  The following tests were independently interpreted by me as noted in my documentation: Echo,   Ordering of each unique test  30 minutes spent by me on the date of the encounter doing chart review, history and exam, documentation and further activities per the note  Provider  Link to [facility] Help Grid     The level of medical decision making during this visit was of moderate complexity.      No orders of the defined types were placed in this encounter.    No orders of the defined types were placed in this encounter.    There are no discontinued medications.      Encounter Diagnosis   Name Primary?    Mitral valve prolapse        CURRENT MEDICATIONS:  Current Outpatient Medications   Medication Sig Dispense Refill    alendronate (FOSAMAX) 70 MG tablet Take 70 mg by mouth every 7 days      Calcium Carb-Cholecalciferol (CALCIUM 500 + D PO)       cetirizine  "(ZYRTEC) 10 MG tablet Take 10 mg by mouth daily as needed for allergies      escitalopram (LEXAPRO) 10 MG tablet Take 10 mg by mouth daily      fish oil-omega-3 fatty acids 1000 MG capsule Take 1,000 mg by mouth      gabapentin (NEURONTIN) 300 MG capsule Take 300 mg by mouth every 8 hours (Patient not taking: Reported on 2025)      lisinopril (ZESTRIL) 10 MG tablet Take 10 mg by mouth daily      Multiple Vitamins-Minerals (MACULAR HEALTH FORMULA PO)       Multiple Vitamins-Minerals (WOMENS 50+ MULTI VITAMIN PO)          ALLERGIES     Allergies   Allergen Reactions    Banana GI Disturbance    Sulfa Antibiotics Rash       PAST MEDICAL HISTORY:  History reviewed. No pertinent past medical history.    PAST SURGICAL HISTORY:  History reviewed. No pertinent surgical history.    FAMILY HISTORY:  History reviewed. No pertinent family history.    SOCIAL HISTORY:  Social History     Socioeconomic History    Marital status:      Spouse name: None    Number of children: None    Years of education: None    Highest education level: None   Tobacco Use    Smoking status: Former     Current packs/day: 0.00     Types: Cigarettes     Quit date:      Years since quittin.3     Passive exposure: Never    Smokeless tobacco: Never       Review of Systems:  Skin:        Eyes:       ENT:       Respiratory:       Cardiovascular:       Gastroenterology:      Genitourinary:       Musculoskeletal:       Neurologic:       Psychiatric:       Heme/Lymph/Imm:       Endocrine:         Physical Exam:  Vitals: Ht 1.689 m (5' 6.5\")   Wt 78 kg (172 lb)   LMP  (LMP Unknown)   BMI 27.35 kg/m      Constitutional:           Skin:             Head:           Eyes:           Lymph:      ENT:           Neck:           Respiratory:            Cardiac:                                                           GI:           Extremities and Muscular Skeletal:                 Neurological:           Psych:         Recent Lab Results:  LIPID " "RESULTS:  No results found for: \"CHOL\", \"HDL\", \"LDL\", \"TRIG\", \"CHOLHDLRATIO\"    LIVER ENZYME RESULTS:  No results found for: \"AST\", \"ALT\"    CBC RESULTS:  No results found for: \"WBC\", \"RBC\", \"HGB\", \"HCT\", \"MCV\", \"MCH\", \"MCHC\", \"RDW\", \"PLT\"    BMP RESULTS:  No results found for: \"NA\", \"POTASSIUM\", \"CHLORIDE\", \"CO2\", \"ANIONGAP\", \"GLC\", \"BUN\", \"CR\", \"GFRESTIMATED\", \"GFRESTBLACK\", \"SAEED\"     A1C RESULTS:  No results found for: \"A1C\"    INR RESULTS:  No results found for: \"INR\"          Steve Torres MD  [address]  [place] [id number]                "

## 2025-06-08 ENCOUNTER — HEALTH MAINTENANCE LETTER (OUTPATIENT)
Age: 80
End: 2025-06-08